# Patient Record
Sex: FEMALE | Race: WHITE | NOT HISPANIC OR LATINO | URBAN - METROPOLITAN AREA
[De-identification: names, ages, dates, MRNs, and addresses within clinical notes are randomized per-mention and may not be internally consistent; named-entity substitution may affect disease eponyms.]

---

## 2017-08-04 ENCOUNTER — OUTPATIENT (OUTPATIENT)
Dept: OUTPATIENT SERVICES | Facility: HOSPITAL | Age: 82
LOS: 1 days | Discharge: HOME | End: 2017-08-04
Payer: MEDICARE

## 2017-08-04 DIAGNOSIS — D64.9 ANEMIA, UNSPECIFIED: ICD-10-CM

## 2017-08-04 PROCEDURE — 45378 DIAGNOSTIC COLONOSCOPY: CPT

## 2017-08-04 PROCEDURE — 43239 EGD BIOPSY SINGLE/MULTIPLE: CPT

## 2017-08-04 PROCEDURE — 88312 SPECIAL STAINS GROUP 1: CPT

## 2017-08-04 PROCEDURE — 88312 SPECIAL STAINS GROUP 1: CPT | Mod: 26

## 2017-08-04 PROCEDURE — 88305 TISSUE EXAM BY PATHOLOGIST: CPT | Mod: 26

## 2017-08-04 PROCEDURE — 88305 TISSUE EXAM BY PATHOLOGIST: CPT

## 2017-08-08 LAB — SURGICAL PATHOLOGY FINAL REPORT - CH: SIGNIFICANT CHANGE UP

## 2017-08-11 DIAGNOSIS — Z95.1 PRESENCE OF AORTOCORONARY BYPASS GRAFT: ICD-10-CM

## 2017-08-11 DIAGNOSIS — E11.9 TYPE 2 DIABETES MELLITUS WITHOUT COMPLICATIONS: ICD-10-CM

## 2017-08-11 DIAGNOSIS — K57.30 DIVERTICULOSIS OF LARGE INTESTINE WITHOUT PERFORATION OR ABSCESS WITHOUT BLEEDING: ICD-10-CM

## 2017-08-11 DIAGNOSIS — D64.9 ANEMIA, UNSPECIFIED: ICD-10-CM

## 2017-08-11 DIAGNOSIS — I25.2 OLD MYOCARDIAL INFARCTION: ICD-10-CM

## 2017-08-11 DIAGNOSIS — Z95.0 PRESENCE OF CARDIAC PACEMAKER: ICD-10-CM

## 2017-08-11 DIAGNOSIS — I25.810 ATHEROSCLEROSIS OF CORONARY ARTERY BYPASS GRAFT(S) WITHOUT ANGINA PECTORIS: ICD-10-CM

## 2017-08-11 DIAGNOSIS — K64.8 OTHER HEMORRHOIDS: ICD-10-CM

## 2017-08-11 DIAGNOSIS — I10 ESSENTIAL (PRIMARY) HYPERTENSION: ICD-10-CM

## 2017-08-11 DIAGNOSIS — Z95.5 PRESENCE OF CORONARY ANGIOPLASTY IMPLANT AND GRAFT: ICD-10-CM

## 2017-08-11 DIAGNOSIS — K44.9 DIAPHRAGMATIC HERNIA WITHOUT OBSTRUCTION OR GANGRENE: ICD-10-CM

## 2017-08-11 DIAGNOSIS — Z88.1 ALLERGY STATUS TO OTHER ANTIBIOTIC AGENTS STATUS: ICD-10-CM

## 2020-11-16 ENCOUNTER — APPOINTMENT (OUTPATIENT)
Dept: RHEUMATOLOGY | Facility: CLINIC | Age: 85
End: 2020-11-16
Payer: MEDICARE

## 2020-11-16 VITALS
DIASTOLIC BLOOD PRESSURE: 70 MMHG | HEIGHT: 62 IN | TEMPERATURE: 97.6 F | HEART RATE: 70 BPM | RESPIRATION RATE: 16 BRPM | SYSTOLIC BLOOD PRESSURE: 170 MMHG | OXYGEN SATURATION: 96 % | BODY MASS INDEX: 28.16 KG/M2 | WEIGHT: 153 LBS

## 2020-11-16 DIAGNOSIS — Z86.39 PERSONAL HISTORY OF OTHER ENDOCRINE, NUTRITIONAL AND METABOLIC DISEASE: ICD-10-CM

## 2020-11-16 DIAGNOSIS — Z87.898 PERSONAL HISTORY OF OTHER SPECIFIED CONDITIONS: ICD-10-CM

## 2020-11-16 DIAGNOSIS — Z86.79 PERSONAL HISTORY OF OTHER DISEASES OF THE CIRCULATORY SYSTEM: ICD-10-CM

## 2020-11-16 PROBLEM — Z00.00 ENCOUNTER FOR PREVENTIVE HEALTH EXAMINATION: Status: ACTIVE | Noted: 2020-11-16

## 2020-11-16 PROCEDURE — 99204 OFFICE O/P NEW MOD 45 MIN: CPT

## 2020-11-19 PROBLEM — Z86.79 HISTORY OF CORONARY ARTERY DISEASE: Status: RESOLVED | Noted: 2020-11-16 | Resolved: 2020-11-19

## 2020-11-19 PROBLEM — Z86.39 HISTORY OF DIABETIC NEUROPATHY: Status: RESOLVED | Noted: 2020-11-16 | Resolved: 2020-11-19

## 2020-11-19 PROBLEM — Z86.79 HISTORY OF HYPERTENSION: Status: RESOLVED | Noted: 2020-11-16 | Resolved: 2020-11-19

## 2020-11-19 PROBLEM — Z87.898 HISTORY OF INSOMNIA: Status: RESOLVED | Noted: 2020-11-16 | Resolved: 2020-11-19

## 2020-11-19 PROBLEM — Z86.39 HISTORY OF TYPE 2 DIABETES MELLITUS: Status: RESOLVED | Noted: 2020-11-16 | Resolved: 2020-11-19

## 2020-11-19 LAB
ALBUMIN SERPL ELPH-MCNC: 4.1 G/DL
ALP BLD-CCNC: 88 U/L
ALT SERPL-CCNC: 12 U/L
ANION GAP SERPL CALC-SCNC: 12 MMOL/L
AST SERPL-CCNC: 18 U/L
BASOPHILS # BLD AUTO: 0.05 K/UL
BASOPHILS NFR BLD AUTO: 0.7 %
BILIRUB SERPL-MCNC: 0.3 MG/DL
BUN SERPL-MCNC: 35 MG/DL
CALCIUM SERPL-MCNC: 9.6 MG/DL
CHLORIDE SERPL-SCNC: 101 MMOL/L
CO2 SERPL-SCNC: 24 MMOL/L
CREAT SERPL-MCNC: 1.59 MG/DL
CRP SERPL-MCNC: 0.44 MG/DL
EOSINOPHIL # BLD AUTO: 0.13 K/UL
EOSINOPHIL NFR BLD AUTO: 1.8 %
ERYTHROCYTE [SEDIMENTATION RATE] IN BLOOD BY WESTERGREN METHOD: 53 MM/HR
GLUCOSE SERPL-MCNC: 162 MG/DL
HCT VFR BLD CALC: 33.3 %
HGB BLD-MCNC: 10 G/DL
IMM GRANULOCYTES NFR BLD AUTO: 0.3 %
LYMPHOCYTES # BLD AUTO: 1.74 K/UL
LYMPHOCYTES NFR BLD AUTO: 24.1 %
MAN DIFF?: NORMAL
MCHC RBC-ENTMCNC: 29.4 PG
MCHC RBC-ENTMCNC: 30 GM/DL
MCV RBC AUTO: 97.9 FL
MONOCYTES # BLD AUTO: 0.73 K/UL
MONOCYTES NFR BLD AUTO: 10.1 %
NEUTROPHILS # BLD AUTO: 4.54 K/UL
NEUTROPHILS NFR BLD AUTO: 63 %
PLATELET # BLD AUTO: 239 K/UL
POTASSIUM SERPL-SCNC: 4.8 MMOL/L
PROT SERPL-MCNC: 6.6 G/DL
RBC # BLD: 3.4 M/UL
RBC # FLD: 14.9 %
SODIUM SERPL-SCNC: 136 MMOL/L
WBC # FLD AUTO: 7.21 K/UL

## 2020-11-19 RX ORDER — GABAPENTIN 100 MG/1
100 CAPSULE ORAL
Qty: 180 | Refills: 0 | Status: ACTIVE | COMMUNITY
Start: 2020-10-14

## 2020-11-19 RX ORDER — AMLODIPINE BESYLATE 2.5 MG/1
2.5 TABLET ORAL
Qty: 30 | Refills: 0 | Status: ACTIVE | COMMUNITY
Start: 2020-06-21

## 2020-11-19 RX ORDER — ROSUVASTATIN CALCIUM 20 MG/1
20 TABLET, FILM COATED ORAL
Qty: 90 | Refills: 0 | Status: ACTIVE | COMMUNITY
Start: 2020-07-01

## 2020-11-19 RX ORDER — APIXABAN 2.5 MG/1
2.5 TABLET, FILM COATED ORAL
Qty: 180 | Refills: 0 | Status: ACTIVE | COMMUNITY
Start: 2020-10-29

## 2020-11-19 RX ORDER — INSULIN ASPART 100 [IU]/ML
(70-30) 100 INJECTION, SUSPENSION SUBCUTANEOUS
Qty: 15 | Refills: 0 | Status: ACTIVE | COMMUNITY
Start: 2020-10-29

## 2020-11-19 RX ORDER — OMEPRAZOLE 20 MG/1
20 CAPSULE, DELAYED RELEASE ORAL
Qty: 30 | Refills: 0 | Status: ACTIVE | COMMUNITY
Start: 2020-07-29

## 2020-11-19 RX ORDER — MECLIZINE HYDROCHLORIDE 12.5 MG/1
12.5 TABLET ORAL
Qty: 60 | Refills: 0 | Status: ACTIVE | COMMUNITY
Start: 2020-08-24

## 2020-11-19 RX ORDER — AMMONIUM LACTATE 12 %
12 CREAM (GRAM) TOPICAL
Qty: 140 | Refills: 0 | Status: ACTIVE | COMMUNITY
Start: 2020-07-01

## 2020-11-19 RX ORDER — MUPIROCIN 20 MG/G
2 OINTMENT TOPICAL
Qty: 22 | Refills: 0 | Status: ACTIVE | COMMUNITY
Start: 2020-06-11

## 2020-11-19 RX ORDER — ISOPROPYL ALCOHOL 70 ML/100ML
SWAB TOPICAL
Qty: 200 | Refills: 0 | Status: ACTIVE | COMMUNITY
Start: 2020-06-28

## 2020-11-19 RX ORDER — TEMAZEPAM 15 MG/1
15 CAPSULE ORAL
Qty: 30 | Refills: 0 | Status: ACTIVE | COMMUNITY
Start: 2020-10-07

## 2020-11-19 RX ORDER — LOTEPREDNOL ETABONATE 5 MG/G
0.5 OINTMENT OPHTHALMIC
Qty: 4 | Refills: 0 | Status: ACTIVE | COMMUNITY
Start: 2020-07-06

## 2020-11-19 RX ORDER — PREDNISONE 20 MG/1
20 TABLET ORAL
Qty: 60 | Refills: 0 | Status: ACTIVE | COMMUNITY
Start: 2020-11-11 | End: 1900-01-01

## 2020-11-19 RX ORDER — GABAPENTIN 300 MG/1
300 CAPSULE ORAL
Qty: 180 | Refills: 0 | Status: ACTIVE | COMMUNITY
Start: 2020-10-11

## 2020-11-19 RX ORDER — BEPOTASTINE BESILATE 15 MG/ML
1.5 SOLUTION/ DROPS OPHTHALMIC
Qty: 5 | Refills: 0 | Status: ACTIVE | COMMUNITY
Start: 2020-06-07

## 2020-11-19 RX ORDER — PEN NEEDLE, DIABETIC 32 GX 1/4"
32G X 6 MM NEEDLE, DISPOSABLE MISCELLANEOUS
Qty: 100 | Refills: 0 | Status: ACTIVE | COMMUNITY
Start: 2020-06-19

## 2020-11-19 RX ORDER — INSULIN GLARGINE 300 U/ML
300 INJECTION, SOLUTION SUBCUTANEOUS
Qty: 4 | Refills: 0 | Status: ACTIVE | COMMUNITY
Start: 2020-05-22

## 2020-11-19 RX ORDER — LOSARTAN POTASSIUM 25 MG/1
25 TABLET, FILM COATED ORAL
Qty: 60 | Refills: 0 | Status: ACTIVE | COMMUNITY
Start: 2020-06-28

## 2020-11-19 RX ORDER — INSULIN ASPART 100 [IU]/ML
100 INJECTION, SOLUTION INTRAVENOUS; SUBCUTANEOUS
Qty: 15 | Refills: 0 | Status: ACTIVE | COMMUNITY
Start: 2020-11-11

## 2020-11-24 LAB — IL6 SERPL-MCNC: 3 PG/ML

## 2020-11-30 ENCOUNTER — NON-APPOINTMENT (OUTPATIENT)
Age: 85
End: 2020-11-30

## 2020-12-09 ENCOUNTER — NON-APPOINTMENT (OUTPATIENT)
Age: 85
End: 2020-12-09

## 2020-12-31 ENCOUNTER — NON-APPOINTMENT (OUTPATIENT)
Age: 85
End: 2020-12-31

## 2021-01-05 ENCOUNTER — APPOINTMENT (OUTPATIENT)
Dept: RHEUMATOLOGY | Facility: CLINIC | Age: 86
End: 2021-01-05
Payer: MEDICARE

## 2021-01-05 PROCEDURE — 99213 OFFICE O/P EST LOW 20 MIN: CPT | Mod: 95

## 2021-04-28 ENCOUNTER — NON-APPOINTMENT (OUTPATIENT)
Age: 86
End: 2021-04-28

## 2021-04-28 ENCOUNTER — APPOINTMENT (OUTPATIENT)
Dept: OPHTHALMOLOGY | Facility: CLINIC | Age: 86
End: 2021-04-28
Payer: MEDICARE

## 2021-04-28 PROCEDURE — 92004 COMPRE OPH EXAM NEW PT 1/>: CPT

## 2021-04-28 PROCEDURE — 92014 COMPRE OPH EXAM EST PT 1/>: CPT

## 2021-04-29 ENCOUNTER — LABORATORY RESULT (OUTPATIENT)
Age: 86
End: 2021-04-29

## 2021-04-29 ENCOUNTER — APPOINTMENT (OUTPATIENT)
Dept: RHEUMATOLOGY | Facility: CLINIC | Age: 86
End: 2021-04-29
Payer: MEDICARE

## 2021-04-29 VITALS
DIASTOLIC BLOOD PRESSURE: 78 MMHG | HEART RATE: 76 BPM | HEIGHT: 62 IN | RESPIRATION RATE: 16 BRPM | SYSTOLIC BLOOD PRESSURE: 131 MMHG | WEIGHT: 151 LBS | TEMPERATURE: 97.1 F | BODY MASS INDEX: 27.79 KG/M2 | OXYGEN SATURATION: 97 %

## 2021-04-29 DIAGNOSIS — R51.9 HEADACHE, UNSPECIFIED: ICD-10-CM

## 2021-04-29 DIAGNOSIS — R76.8 OTHER SPECIFIED ABNORMAL IMMUNOLOGICAL FINDINGS IN SERUM: ICD-10-CM

## 2021-04-29 DIAGNOSIS — R70.0 ELEVATED ERYTHROCYTE SEDIMENTATION RATE: ICD-10-CM

## 2021-04-29 DIAGNOSIS — N18.9 CHRONIC KIDNEY DISEASE, UNSPECIFIED: ICD-10-CM

## 2021-04-29 PROCEDURE — 99214 OFFICE O/P EST MOD 30 MIN: CPT

## 2021-05-02 PROBLEM — R76.8 ANA POSITIVE: Status: ACTIVE | Noted: 2021-05-02

## 2021-06-02 LAB
ALBUMIN SERPL ELPH-MCNC: 4.4 G/DL
ALP BLD-CCNC: 82 U/L
ALT SERPL-CCNC: 10 U/L
ANA PAT FLD IF-IMP: ABNORMAL
ANA SER IF-ACNC: ABNORMAL
ANION GAP SERPL CALC-SCNC: 13 MMOL/L
APPEARANCE: CLEAR
AST SERPL-CCNC: 18 U/L
BACTERIA: NEGATIVE
BASOPHILS # BLD AUTO: 0.06 K/UL
BASOPHILS NFR BLD AUTO: 0.7 %
BILIRUB SERPL-MCNC: 0.2 MG/DL
BILIRUBIN URINE: NEGATIVE
BLOOD URINE: NEGATIVE
BUN SERPL-MCNC: 45 MG/DL
C3 SERPL-MCNC: 111 MG/DL
C4 SERPL-MCNC: 29 MG/DL
CALCIUM SERPL-MCNC: 9.6 MG/DL
CARDIOLIPIN AB SER IA-ACNC: POSITIVE
CHLORIDE SERPL-SCNC: 104 MMOL/L
CO2 SERPL-SCNC: 20 MMOL/L
COLOR: COLORLESS
CREAT SERPL-MCNC: 1.84 MG/DL
CREAT SPEC-SCNC: 44 MG/DL
CREAT/PROT UR: 1.4 RATIO
CRP SERPL-MCNC: <3 MG/L
DSDNA AB SER-ACNC: 17 IU/ML
ENA RNP AB SER IA-ACNC: <0.2 AL
ENA SM AB SER IA-ACNC: <0.2 AL
ENA SS-A AB SER IA-ACNC: 0.7 AL
ENA SS-B AB SER IA-ACNC: <0.2 AL
EOSINOPHIL # BLD AUTO: 0.17 K/UL
EOSINOPHIL NFR BLD AUTO: 1.9 %
ERYTHROCYTE [SEDIMENTATION RATE] IN BLOOD BY WESTERGREN METHOD: 65 MM/HR
GLUCOSE QUALITATIVE U: NEGATIVE
GLUCOSE SERPL-MCNC: 224 MG/DL
HCT VFR BLD CALC: 35.8 %
HGB BLD-MCNC: 10.9 G/DL
HYALINE CASTS: 0 /LPF
IMM GRANULOCYTES NFR BLD AUTO: 0.1 %
KETONES URINE: NEGATIVE
LEUKOCYTE ESTERASE URINE: NEGATIVE
LYMPHOCYTES # BLD AUTO: 3.1 K/UL
LYMPHOCYTES NFR BLD AUTO: 35.3 %
MAN DIFF?: NORMAL
MCHC RBC-ENTMCNC: 28.4 PG
MCHC RBC-ENTMCNC: 30.4 GM/DL
MCV RBC AUTO: 93.2 FL
MICROSCOPIC-UA: NORMAL
MONOCYTES # BLD AUTO: 0.81 K/UL
MONOCYTES NFR BLD AUTO: 9.2 %
MPO AB + PR3 PNL SER: NORMAL
MYELOPEROXIDASE AB SER QL IA: 13 UNITS
MYELOPEROXIDASE CELLS FLD QL: NEGATIVE
NEUTROPHILS # BLD AUTO: 4.62 K/UL
NEUTROPHILS NFR BLD AUTO: 52.8 %
NITRITE URINE: NEGATIVE
PH URINE: 6
PLATELET # BLD AUTO: 203 K/UL
POTASSIUM SERPL-SCNC: 4.8 MMOL/L
PROT SERPL-MCNC: 7 G/DL
PROT UR-MCNC: 60 MG/DL
PROTEIN URINE: ABNORMAL
PROTEINASE3 AB SER IA-ACNC: <5 UNITS
PROTEINASE3 AB SER-ACNC: NEGATIVE
RBC # BLD: 3.84 M/UL
RBC # FLD: 14.1 %
RED BLOOD CELLS URINE: 1 /HPF
SODIUM SERPL-SCNC: 137 MMOL/L
SPECIFIC GRAVITY URINE: 1.01
SQUAMOUS EPITHELIAL CELLS: 0 /HPF
UROBILINOGEN URINE: NORMAL
WBC # FLD AUTO: 8.77 K/UL
WHITE BLOOD CELLS URINE: 1 /HPF

## 2025-01-28 ENCOUNTER — EMERGENCY (EMERGENCY)
Facility: HOSPITAL | Age: 89
LOS: 1 days | Discharge: HOME | End: 2025-01-28
Attending: STUDENT IN AN ORGANIZED HEALTH CARE EDUCATION/TRAINING PROGRAM
Payer: MEDICARE

## 2025-01-28 VITALS
OXYGEN SATURATION: 97 % | RESPIRATION RATE: 16 BRPM | TEMPERATURE: 98 F | WEIGHT: 149.91 LBS | SYSTOLIC BLOOD PRESSURE: 157 MMHG | DIASTOLIC BLOOD PRESSURE: 60 MMHG | HEART RATE: 66 BPM

## 2025-01-28 LAB
ALBUMIN SERPL ELPH-MCNC: 3.2 G/DL — LOW (ref 3.5–5)
ALP SERPL-CCNC: 53 U/L — SIGNIFICANT CHANGE UP (ref 40–120)
ALT FLD-CCNC: 12 U/L DA — SIGNIFICANT CHANGE UP (ref 10–60)
ANION GAP SERPL CALC-SCNC: 7 MMOL/L — SIGNIFICANT CHANGE UP (ref 5–17)
AST SERPL-CCNC: 20 U/L — SIGNIFICANT CHANGE UP (ref 10–40)
BASE EXCESS BLDV CALC-SCNC: -10.2 MMOL/L — SIGNIFICANT CHANGE UP
BASOPHILS # BLD AUTO: 0.03 K/UL — SIGNIFICANT CHANGE UP (ref 0–0.2)
BASOPHILS NFR BLD AUTO: 0.3 % — SIGNIFICANT CHANGE UP (ref 0–2)
BILIRUB SERPL-MCNC: 0.3 MG/DL — SIGNIFICANT CHANGE UP (ref 0.2–1.2)
BUN SERPL-MCNC: 46 MG/DL — HIGH (ref 7–18)
CALCIUM SERPL-MCNC: 8.6 MG/DL — SIGNIFICANT CHANGE UP (ref 8.4–10.5)
CHLORIDE SERPL-SCNC: 106 MMOL/L — SIGNIFICANT CHANGE UP (ref 96–108)
CO2 SERPL-SCNC: 17 MMOL/L — LOW (ref 22–31)
CREAT SERPL-MCNC: 1.92 MG/DL — HIGH (ref 0.5–1.3)
EGFR: 25 ML/MIN/1.73M2 — LOW
EOSINOPHIL # BLD AUTO: 0.23 K/UL — SIGNIFICANT CHANGE UP (ref 0–0.5)
EOSINOPHIL NFR BLD AUTO: 2.6 % — SIGNIFICANT CHANGE UP (ref 0–6)
FLUAV AG NPH QL: DETECTED
FLUBV AG NPH QL: SIGNIFICANT CHANGE UP
GLUCOSE SERPL-MCNC: 345 MG/DL — HIGH (ref 70–99)
HCO3 BLDV-SCNC: 15 MMOL/L — LOW (ref 22–29)
HCT VFR BLD CALC: 34.6 % — SIGNIFICANT CHANGE UP (ref 34.5–45)
HGB BLD-MCNC: 11.5 G/DL — SIGNIFICANT CHANGE UP (ref 11.5–15.5)
IMM GRANULOCYTES NFR BLD AUTO: 0.3 % — SIGNIFICANT CHANGE UP (ref 0–0.9)
LIDOCAIN IGE QN: 32 U/L — SIGNIFICANT CHANGE UP (ref 13–75)
LYMPHOCYTES # BLD AUTO: 1.23 K/UL — SIGNIFICANT CHANGE UP (ref 1–3.3)
LYMPHOCYTES # BLD AUTO: 13.9 % — SIGNIFICANT CHANGE UP (ref 13–44)
MAGNESIUM SERPL-MCNC: 1.9 MG/DL — SIGNIFICANT CHANGE UP (ref 1.6–2.6)
MCHC RBC-ENTMCNC: 29.5 PG — SIGNIFICANT CHANGE UP (ref 27–34)
MCHC RBC-ENTMCNC: 33.2 G/DL — SIGNIFICANT CHANGE UP (ref 32–36)
MCV RBC AUTO: 88.7 FL — SIGNIFICANT CHANGE UP (ref 80–100)
MONOCYTES # BLD AUTO: 0.72 K/UL — SIGNIFICANT CHANGE UP (ref 0–0.9)
MONOCYTES NFR BLD AUTO: 8.2 % — SIGNIFICANT CHANGE UP (ref 2–14)
NEUTROPHILS # BLD AUTO: 6.58 K/UL — SIGNIFICANT CHANGE UP (ref 1.8–7.4)
NEUTROPHILS NFR BLD AUTO: 74.7 % — SIGNIFICANT CHANGE UP (ref 43–77)
NRBC # BLD: 0 /100 WBCS — SIGNIFICANT CHANGE UP (ref 0–0)
NT-PROBNP SERPL-SCNC: 1200 PG/ML — HIGH (ref 0–450)
PCO2 BLDV: 29 MMHG — LOW (ref 39–42)
PH BLDV: 7.31 — LOW (ref 7.32–7.43)
PLATELET # BLD AUTO: 222 K/UL — SIGNIFICANT CHANGE UP (ref 150–400)
PO2 BLDV: 89 MMHG — SIGNIFICANT CHANGE UP
POTASSIUM SERPL-MCNC: 5.7 MMOL/L — HIGH (ref 3.5–5.3)
POTASSIUM SERPL-SCNC: 5.7 MMOL/L — HIGH (ref 3.5–5.3)
PROT SERPL-MCNC: 7.5 G/DL — SIGNIFICANT CHANGE UP (ref 6–8.3)
RBC # BLD: 3.9 M/UL — SIGNIFICANT CHANGE UP (ref 3.8–5.2)
RBC # FLD: 14.4 % — SIGNIFICANT CHANGE UP (ref 10.3–14.5)
RSV RNA NPH QL NAA+NON-PROBE: SIGNIFICANT CHANGE UP
SAO2 % BLDV: 97.5 % — SIGNIFICANT CHANGE UP
SARS-COV-2 RNA SPEC QL NAA+PROBE: SIGNIFICANT CHANGE UP
SODIUM SERPL-SCNC: 130 MMOL/L — LOW (ref 135–145)
TROPONIN I, HIGH SENSITIVITY RESULT: 25.9 NG/L — SIGNIFICANT CHANGE UP
WBC # BLD: 8.82 K/UL — SIGNIFICANT CHANGE UP (ref 3.8–10.5)
WBC # FLD AUTO: 8.82 K/UL — SIGNIFICANT CHANGE UP (ref 3.8–10.5)

## 2025-01-28 PROCEDURE — 99284 EMERGENCY DEPT VISIT MOD MDM: CPT | Mod: 25

## 2025-01-28 PROCEDURE — 71250 CT THORAX DX C-: CPT | Mod: MC

## 2025-01-28 PROCEDURE — 36415 COLL VENOUS BLD VENIPUNCTURE: CPT

## 2025-01-28 PROCEDURE — 83735 ASSAY OF MAGNESIUM: CPT

## 2025-01-28 PROCEDURE — 87637 SARSCOV2&INF A&B&RSV AMP PRB: CPT

## 2025-01-28 PROCEDURE — 96375 TX/PRO/DX INJ NEW DRUG ADDON: CPT | Mod: XU

## 2025-01-28 PROCEDURE — 82803 BLOOD GASES ANY COMBINATION: CPT

## 2025-01-28 PROCEDURE — 96374 THER/PROPH/DIAG INJ IV PUSH: CPT | Mod: XU

## 2025-01-28 PROCEDURE — 84484 ASSAY OF TROPONIN QUANT: CPT

## 2025-01-28 PROCEDURE — 99285 EMERGENCY DEPT VISIT HI MDM: CPT

## 2025-01-28 PROCEDURE — 85025 COMPLETE CBC W/AUTO DIFF WBC: CPT

## 2025-01-28 PROCEDURE — 74176 CT ABD & PELVIS W/O CONTRAST: CPT | Mod: MC

## 2025-01-28 PROCEDURE — 83880 ASSAY OF NATRIURETIC PEPTIDE: CPT

## 2025-01-28 PROCEDURE — 71045 X-RAY EXAM CHEST 1 VIEW: CPT

## 2025-01-28 PROCEDURE — 74176 CT ABD & PELVIS W/O CONTRAST: CPT | Mod: 26

## 2025-01-28 PROCEDURE — 71250 CT THORAX DX C-: CPT | Mod: 26

## 2025-01-28 PROCEDURE — 82962 GLUCOSE BLOOD TEST: CPT

## 2025-01-28 PROCEDURE — 80053 COMPREHEN METABOLIC PANEL: CPT

## 2025-01-28 PROCEDURE — 71045 X-RAY EXAM CHEST 1 VIEW: CPT | Mod: 26

## 2025-01-28 PROCEDURE — 83690 ASSAY OF LIPASE: CPT

## 2025-01-28 RX ORDER — MORPHINE SULFATE 15 MG
1 TABLET, EXTENDED RELEASE ORAL ONCE
Refills: 0 | Status: DISCONTINUED | OUTPATIENT
Start: 2025-01-28 | End: 2025-01-28

## 2025-01-28 RX ORDER — VALACYCLOVIR HYDROCHLORIDE 1000 MG/1
1 TABLET, FILM COATED ORAL
Qty: 14 | Refills: 0
Start: 2025-01-28 | End: 2025-02-03

## 2025-01-28 RX ORDER — ACETAMINOPHEN 80 MG/.8ML
1000 SOLUTION/ DROPS ORAL ONCE
Refills: 0 | Status: COMPLETED | OUTPATIENT
Start: 2025-01-28 | End: 2025-01-28

## 2025-01-28 RX ORDER — OXYCODONE AND ACETAMINOPHEN 5; 325 MG/1; MG/1
1 TABLET ORAL
Qty: 20 | Refills: 0
Start: 2025-01-28 | End: 2025-02-01

## 2025-01-28 RX ORDER — ACETAMINOPHEN 80 MG/.8ML
2 SOLUTION/ DROPS ORAL
Qty: 80 | Refills: 0
Start: 2025-01-28 | End: 2025-02-06

## 2025-01-28 RX ADMIN — ACETAMINOPHEN 1000 MILLIGRAM(S): 80 SOLUTION/ DROPS ORAL at 20:29

## 2025-01-28 RX ADMIN — Medication 1 MILLIGRAM(S): at 17:38

## 2025-01-28 RX ADMIN — ACETAMINOPHEN 400 MILLIGRAM(S): 80 SOLUTION/ DROPS ORAL at 17:05

## 2025-01-28 RX ADMIN — Medication 1 MILLIGRAM(S): at 17:08

## 2025-01-28 NOTE — ED PROVIDER NOTE - PATIENT PORTAL LINK FT
You can access the FollowMyHealth Patient Portal offered by [facility] by registering at the following website: [email or web address]. By joining [employer]’s FollowMyHealth portal, you will also be able to view your health information using other applications (apps) compatible with our system.

## 2025-01-28 NOTE — ED ADULT NURSE NOTE - LANGUAGE ASSISTANCE NEEDED
granddaughter speaks Luxembourger/No-Patient/Caregiver offered and refused free interpretation services.

## 2025-01-28 NOTE — ED PROVIDER NOTE - NSFOLLOWUPINSTRUCTIONS_ED_ALL_ED_FT
Shingles (herpes zoster) causes pain and a blistered rash. The rash can appear anywhere on the body but will be on only one side of the body, the left or right. It will be in a band, a strip, or a small area. The pain can be very severe. Shingles can also cause tingling or itching in the area of the rash. The blisters scab over after a few days and heal in 2 to 4 weeks. Medicines can help you feel better and may help prevent more serious problems caused by shingles.    Shingles is caused by the same virus that causes chickenpox. When you have chickenpox, the virus gets into your nerve roots and stays there (becomes dormant) long after you get over the chickenpox. If the virus becomes active again, it can cause shingles.    Influenza (flu) is an infection in the lungs and breathing passages. It is caused by the influenza virus. There are different strains, or types, of the flu virus from year to year. Unlike the common cold, the flu comes on suddenly and the symptoms can be more severe. These symptoms include a cough, congestion, fever, chills, fatigue, aches, and pains. These symptoms may last for a few weeks. Although the flu can make you feel very sick, it usually doesn't cause serious health problems.    Home treatment is usually all you need for flu symptoms. But your doctor may prescribe antiviral medicine to prevent other health problems, such as pneumonia, from developing. The risk of other health problems from the flu is highest for young children (under 5), older adults (over 65), pregnant women, people with long-term health conditions, people who live in nursing homes or long-term care centres, and indigenous peoples.

## 2025-01-28 NOTE — ED ADULT NURSE NOTE - OBJECTIVE STATEMENT
patient c/o LUQ pain, SOB, and AMS x last night. patient c/o LUQ pain, SOB, and AMS x last night. patient is A&O3 at the moment. patient has left pacemaker.

## 2025-01-28 NOTE — ED PROVIDER NOTE - PHYSICAL EXAMINATION
General: Elderly, frail appearing  HEENT: Loss of orbital fat. Thinning of eyebrows. Dry mucous membranes. Poor dentition.  Heart: RRR. Systolic murmur.   Lungs: Diminished lungs sounds, CTA b/l  Chest/Back: Non-tender chest wall. No CVAT. Kyphosis.  Abdomen: Soft, non-tender, non-distended.  Neuro: No focal deficits. Bilateral Presbycusis.  Extremities: Decreased ROM in hips/knee/shoulders. Pulses intact x 4.  Skin: Thin. Dry. Normal color. No rashes. Capillary refill intact.  +Vesicular rash underneath left breast.  Psych: Calm, cooperative.

## 2025-01-28 NOTE — ED ADULT NURSE NOTE - NSFALLHARMRISKINTERV_ED_ALL_ED

## 2025-01-28 NOTE — ED PROVIDER NOTE - CLINICAL SUMMARY MEDICAL DECISION MAKING FREE TEXT BOX
Elderly female overall well appearing reporting severe pain in left chest wall intermittently x 2 days  No evidence of pnuemonia  Influenza+  Shingles noted underneath left breast, Valacylovir and Percocet prescribed.

## 2025-01-28 NOTE — ED PROVIDER NOTE - OBJECTIVE STATEMENT
89-year-old female with a past medical history of CAD CHF CKD presents with family who provided translation services.  Reports the patient for the last 2 days has been having intermittent sharp left chest pains.  Patient reported to family that 20 years ago she had kidney stones and is the only thing that she can recall that feels similar.  Family at bedside report patient having the pain is becoming intermittently confused, at bedside patient is alert and answering all questions endorsing severe pain.  Denies other symptoms such as fevers nausea vomiting diarrhea.
no